# Patient Record
Sex: FEMALE | Race: WHITE | Employment: OTHER | URBAN - METROPOLITAN AREA
[De-identification: names, ages, dates, MRNs, and addresses within clinical notes are randomized per-mention and may not be internally consistent; named-entity substitution may affect disease eponyms.]

---

## 2019-10-21 ENCOUNTER — TELEPHONE (OUTPATIENT)
Dept: HEMATOLOGY | Age: 79
End: 2019-10-21

## 2019-10-21 NOTE — TELEPHONE ENCOUNTER
Spoke with patient. Patient stated her ALP was elevated in July 2019, hence the appointment with Dr. Mynor Vieira. Patient will contact office to have labs and last office note fax to our office. Has states she has not had any scans over the past year.

## 2019-10-21 NOTE — TELEPHONE ENCOUNTER
Left voice mail for patient to return call in reference to records for upcoming new patient appointment. Attempted to contact referring physician. Physician's line rang and rang. On hold with appointment line for 6 minutes.

## 2019-10-25 ENCOUNTER — OFFICE VISIT (OUTPATIENT)
Dept: HEMATOLOGY | Age: 79
End: 2019-10-25

## 2019-10-25 VITALS
BODY MASS INDEX: 25.52 KG/M2 | OXYGEN SATURATION: 97 % | TEMPERATURE: 95.2 F | WEIGHT: 153.2 LBS | HEIGHT: 65 IN | SYSTOLIC BLOOD PRESSURE: 146 MMHG | DIASTOLIC BLOOD PRESSURE: 74 MMHG | RESPIRATION RATE: 16 BRPM | HEART RATE: 63 BPM

## 2019-10-25 DIAGNOSIS — R74.8 ELEVATED LIVER ENZYMES: Primary | ICD-10-CM

## 2019-10-25 PROBLEM — K21.9 GERD (GASTROESOPHAGEAL REFLUX DISEASE): Status: ACTIVE | Noted: 2019-10-25

## 2019-10-25 RX ORDER — PRAVASTATIN SODIUM 40 MG/1
40 TABLET ORAL DAILY
Refills: 0 | COMMUNITY
Start: 2019-09-18

## 2019-10-25 RX ORDER — DILTIAZEM HYDROCHLORIDE 240 MG/1
240 CAPSULE, EXTENDED RELEASE ORAL EVERY EVENING
Refills: 0 | COMMUNITY
Start: 2019-09-04

## 2019-10-25 RX ORDER — HYDROCHLOROTHIAZIDE 25 MG/1
25 TABLET ORAL DAILY
Refills: 0 | COMMUNITY
Start: 2019-09-18

## 2019-10-25 RX ORDER — CLONIDINE HYDROCHLORIDE 0.2 MG/1
0.2 TABLET ORAL DAILY
Refills: 1 | COMMUNITY
Start: 2019-07-22

## 2019-10-25 RX ORDER — LISINOPRIL 40 MG/1
40 TABLET ORAL DAILY
Refills: 0 | COMMUNITY
Start: 2019-08-30

## 2019-10-25 RX ORDER — LEVOTHYROXINE SODIUM 88 UG/1
88 TABLET ORAL
Refills: 0 | COMMUNITY
Start: 2019-08-30

## 2019-10-25 NOTE — Clinical Note
10/25/19 Patient: Phu Crenshaw YOB: 1940 Date of Visit: 10/25/2019 Car Andre MD 
[address] [id number] VIA In Basket Dear Car Andre MD, Thank you for referring Ms. Sherwin Fothergill to [facility] for evaluation. My notes for this consultation are attached. If you have questions, please do not hesitate to call me. I look forward to following your patient along with you. Sincerely, Cesilia Romano MD

## 2019-10-25 NOTE — PROGRESS NOTES
[facility], Karl SLOAN Noel Kraft, MD Deirdre Boot, PANICOLLE Pitts, [facility]     July RIOS Kelsie, [facility]   Tarsha Soto, P-LESLIE Jorge, [facility]       [facility] 136    at [address] [place], [address] [place]    [address] [facility] 22.    [phone]    FAX: [address] [place] [address], [address]    [phone]    FAX: [phone]       Patient Care Team:  Crescencio White MD as PCP - General (Family Practice)      Problem List  Date Reviewed: 10/25/2019          Codes Class Noted    Elevated alkaline phosphatase level ICD-10-CM: R74.8  ICD-9-CM: 790.5  10/25/2019        Hypothyroidism ICD-10-CM: E03.9  ICD-9-CM: 244.9  8/13/2012        HTN (hypertension) ICD-10-CM: I10  ICD-9-CM: 401.9  2/10/2012        Hypercholesteremia ICD-10-CM: E78.00  ICD-9-CM: 272.0  2/10/2012              The clinicians listed above have asked me to see Ne Chi in consultation regarding elevated liver enzymes and its management. All medical records sent by the referring physicians were reviewed   including imaging studies     The patient is a 78 y.o.  female who was found to have elevated alkaline phosphatase in   7/2019. Serologic evaluation for markers of chronic liver disease was negative for LEONARD,     Imaging of the liver was not performed. An assessment of liver fibrosis with biopsy or elastography has not been performed. The patient had not started any new medications within 3 months preceding the elevation in liver chemistries. The patient has no symptoms which can be attributed to the liver disorder.     The patient has not experienced the following symptoms:  fatigue, pain in the right side over the liver, dry eyes, dry mouth, arthralgias, itching,     The patient completes all daily activities without any functional limitations. ASSESSMENT AND PLAN:  Elevated liver enzymes  Persistent elevation in alkaline phosphatase of unclear etiology at this time. Liver transaminases are normal.  Liver function is normal.  The platelet count is normal.      Serologic testing for causes of chronic liver disease were ordered. The most likely causes for the liver chemistry abnormalities were discussed with the patient and include   fatty liver disease, immune liver disorders,     Have performed laboratory testing to monitor liver function and degree of liver injury. This included BMP, hepatic panel, CBC with platelet count, INR. The need to perform an assessment of liver fibrosis was discussed with the patient. The Fibroscan can assess liver fibrosis and determine if a patient has advanced fibrosis or cirrhosis without the need for liver biopsy. This will be performed at the next office visit. If the Fibroscan suggests advanced fibrosis then a liver biopsy should be considered. The Fibroscan can be repeated annually or as often as clinically indicated to assess for fibrosis progression and/or regression. Screening for Hepatocellular Carcinoma  HCC screening is not necessary if the patient has no evidence of cirrhosis. Treatment of other medical problems in patients with chronic liver disease  There are no contraindications for the patient to take most medications that are necessary for treatment of other medical issues. Counseling for alcohol in patients with chronic liver disease  The patient was counseled regarding alcohol consumption and the effect of alcohol on chronic liver disease. The patient does not consume any significant amount of alcohol.     Vaccinations   The need for vaccination against viral hepatitis A and B will be assessed with serologic and instituted as appropriate. Routine vaccinations against other bacterial and viral agents can be performed as indicated. Annual flu vaccination should be administered if indicated. ALLERGIES  Allergies   Allergen Reactions    Amlodipine Swelling     Foot swelling    Erythromycin Anaphylaxis       MEDICATIONS  Current Outpatient Medications   Medication Sig    levothyroxine (SYNTHROID) 88 mcg tablet Take 88 mcg by mouth Daily (before breakfast).  lisinopril (PRINIVIL, ZESTRIL) 40 mg tablet Take 40 mg by mouth daily.  hydroCHLOROthiazide (HYDRODIURIL) 25 mg tablet Take 25 mg by mouth daily.  CARTIA  mg ER capsule Take 240 mg by mouth every evening.  pravastatin (PRAVACHOL) 40 mg tablet Take 40 mg by mouth daily.  cloNIDine HCl (CATAPRES) 0.2 mg tablet Take 0.2 mg by mouth daily.  Cholecalciferol, Vitamin D3, (VITAMIN D3) 1,000 unit Cap Take 1,000 Units by mouth daily.  omeprazole (PRILOSEC OTC) 20 mg tablet Take 20 mg by mouth daily.  levothyroxine (SYNTHROID) 75 mcg tablet Take 1 Tab by mouth Daily (before breakfast). (Patient not taking: Reported on 10/25/2019)    pravastatin (PRAVACHOL) 20 mg tablet Take 1 Tab by mouth daily. (Patient not taking: Reported on 10/25/2019)    amLODIPine (NORVASC) 5 mg tablet Take 1 Tab by mouth daily. (Patient not taking: Reported on 10/25/2019)    valACYclovir (VALTREX) 1 g tablet Take 1 Tab by mouth three (3) times daily.  triamcinolone (ARISTOCORT) 0.5 % topical cream Apply  to affected area two (2) times a day. use thin layer    losartan-hydrochlorothiazide (HYZAAR) 100-25 mg per tablet Take 1 Tab by mouth daily.  valsartan-hydrochlorothiazide (DIOVAN-HCT) 320-25 mg per tablet Take 1 Tab by mouth daily.  LEVOTHYROXINE SODIUM (LEVOTHYROXINE, BULK,) Take 75 mcg by mouth daily. No current facility-administered medications for this visit.         SYSTEM REVIEW NOT RELATED TO LIVER DISEASE OR REVIEWED ABOVE:  Constitution systems: Negative for fever, chills, weight gain, weight loss. Eyes: Negative for visual changes. ENT: Negative for sore throat, painful swallowing. Respiratory: Negative for cough, hemoptysis, SOB. Cardiology: Negative for chest pain, palpitations. GI:  Constipation  : Negative for urinary frequency, dysuria, hematuria, nocturia. Skin: Negative for rash. Hematology: Negative for easy bruising, blood clots. Musculo-skelatal: Negative for back pain, muscle pain, weakness. Neurologic: Negative for headaches, dizziness, vertigo, memory problems not related to HE. Psychology: Negative for anxiety, depression. FAMILY HISTORY:  The father  of CVA. The mother  at age 80 after a fall. There is no family history of liver disease. SOCIAL HISTORY:  The patient is . The patient has 4 children, 6 grandchildren. The patient stopped using tobacco products in . The patient consumes 1-2 alcoholic beverages per day   The patient used to work as commercial real estate. PHYSICAL EXAMINATION:  Visit Vitals  /74   Pulse 63   Temp 95.2 °F (35.1 °C) (Tympanic)   Resp 16   Ht 5' 5\" (1.651 m)   Wt 153 lb 3.2 oz (69.5 kg)   SpO2 97%   BMI 25.49 kg/m²     General: No acute distress. Eyes: Sclera anicteric. ENT: No oral lesions. Thyroid normal.  Nodes: No adenopathy. Skin: No spider angiomata. No jaundice. No palmar erythema. Respiratory: Lungs clear to auscultation. Cardiovascular: Regular heart rate. No murmurs. No JVD. Abdomen: Soft non-tender. Liver size normal to percussion/palpation. Spleen not palpable. No obvious ascites. Extremities: No edema. No muscle wasting. No gross arthritic changes. Neurologic: Alert and oriented. Cranial nerves grossly intact. No asterixis. LABORATORY STUDIES:  From 2019  AST/ALT/ALP/T Bili/ALB:  25/31/172/0.6/4.4  WBC/HB/PLT/INR:  4.2/13.4/139  BUN/CREAT:  8/0.84    SEROLOGIES:  2019.   LEONARD negative, RF negative    LIVER HISTOLOGY:  Not available or performed    ENDOSCOPIC PROCEDURES:  Not available or performed    RADIOLOGY:  Not available or performed    OTHER TESTIN2019. TSH 7, T4 Free 1.71    FOLLOW-UP:  All of the issues listed above in the Assessment and Plan were discussed with the patient. All questions were answered. The patient expressed a clear understanding of the above. [address] [place] [address] in 4 weeks for Fibroscan to review all data and determine the treatment plan.       Kamilla Lyons MD  [address], [address] [place] [address] 22.  [phone]  [address] [place] [address]

## 2019-10-25 NOTE — PROGRESS NOTES
Identified pt with two pt identifiers(name and ). Reviewed record in preparation for visit and have obtained necessary documentation. Chief Complaint   Patient presents with    New Patient     abnormal labs      Learning Assessment 10/25/2019   PRIMARY LEARNER Patient   HIGHEST LEVEL OF EDUCATION - PRIMARY LEARNER  GRADUATED HIGH SCHOOL OR GED   BARRIERS PRIMARY LEARNER NONE   CO-LEARNER CAREGIVER No   PRIMARY LANGUAGE ENGLISH    NEED No   LEARNER PREFERENCE PRIMARY OTHER (COMMENT)   LEARNING SPECIAL TOPICS no   ANSWERED BY self   RELATIONSHIP SELF   ASSESSMENT COMMENT no       3 most recent PHQ Screens 10/25/2019   Little interest or pleasure in doing things Not at all   Feeling down, depressed, irritable, or hopeless Not at all   Total Score PHQ 2 0       Abuse Screening Questionnaire 10/25/2019   Do you ever feel afraid of your partner? N   Are you in a relationship with someone who physically or mentally threatens you? N   Is it safe for you to go home? Y         Coordination of Care Questionnaire:  :   1) Have you been to an emergency room, urgent care, or hospitalized since your last visit? If yes, where when, and reason for visit? no       2. Have seen or consulted any other health care provider since your last visit? If yes, where when, and reason for visit? NO      Patient is accompanied by self I have received verbal consent from Nighat Bland to discuss any/all medical information while they are present in the room.

## 2019-10-26 LAB
ALBUMIN SERPL-MCNC: 4.2 G/DL (ref 3.5–4.8)
ALP SERPL-CCNC: 224 IU/L (ref 39–117)
ALT SERPL-CCNC: 23 IU/L (ref 0–32)
AST SERPL-CCNC: 33 IU/L (ref 0–40)
BASOPHILS # BLD AUTO: 0 X10E3/UL (ref 0–0.2)
BASOPHILS NFR BLD AUTO: 1 %
BILIRUB DIRECT SERPL-MCNC: 0.22 MG/DL (ref 0–0.4)
BILIRUB SERPL-MCNC: 0.5 MG/DL (ref 0–1.2)
BUN SERPL-MCNC: 7 MG/DL (ref 8–27)
BUN/CREAT SERPL: 9 (ref 12–28)
CALCIUM SERPL-MCNC: 9.5 MG/DL (ref 8.7–10.3)
CHLORIDE SERPL-SCNC: 92 MMOL/L (ref 96–106)
CO2 SERPL-SCNC: 25 MMOL/L (ref 20–29)
COMMENT, 144067: NORMAL
CREAT SERPL-MCNC: 0.82 MG/DL (ref 0.57–1)
EOSINOPHIL # BLD AUTO: 0.1 X10E3/UL (ref 0–0.4)
EOSINOPHIL NFR BLD AUTO: 2 %
ERYTHROCYTE [DISTWIDTH] IN BLOOD BY AUTOMATED COUNT: 12.4 % (ref 12.3–15.4)
GLUCOSE SERPL-MCNC: 97 MG/DL (ref 65–99)
HBV SURFACE AG SERPL QL IA: NEGATIVE
HCT VFR BLD AUTO: 37.8 % (ref 34–46.6)
HCV AB S/CO SERPL IA: <0.1 S/CO RATIO (ref 0–0.9)
HGB BLD-MCNC: 12.7 G/DL (ref 11.1–15.9)
IMM GRANULOCYTES # BLD AUTO: 0 X10E3/UL (ref 0–0.1)
IMM GRANULOCYTES NFR BLD AUTO: 0 %
INR PPP: 1 (ref 0.8–1.2)
LYMPHOCYTES # BLD AUTO: 1.2 X10E3/UL (ref 0.7–3.1)
LYMPHOCYTES NFR BLD AUTO: 26 %
MCH RBC QN AUTO: 29.1 PG (ref 26.6–33)
MCHC RBC AUTO-ENTMCNC: 33.6 G/DL (ref 31.5–35.7)
MCV RBC AUTO: 87 FL (ref 79–97)
MONOCYTES # BLD AUTO: 0.5 X10E3/UL (ref 0.1–0.9)
MONOCYTES NFR BLD AUTO: 11 %
NEUTROPHILS # BLD AUTO: 2.8 X10E3/UL (ref 1.4–7)
NEUTROPHILS NFR BLD AUTO: 60 %
PLATELET # BLD AUTO: 147 X10E3/UL (ref 150–450)
POTASSIUM SERPL-SCNC: 3.5 MMOL/L (ref 3.5–5.2)
PROT SERPL-MCNC: 7.1 G/DL (ref 6–8.5)
PROTHROMBIN TIME: 10.5 SEC (ref 9.1–12)
RBC # BLD AUTO: 4.37 X10E6/UL (ref 3.77–5.28)
SODIUM SERPL-SCNC: 134 MMOL/L (ref 134–144)
WBC # BLD AUTO: 4.6 X10E3/UL (ref 3.4–10.8)

## 2019-10-27 LAB
ACTIN IGG SERPL-ACNC: 129 UNITS (ref 0–19)
MITOCHONDRIA M2 IGG SER-ACNC: 134.1 UNITS (ref 0–20)

## 2019-10-28 LAB
C-ANCA TITR SER IF: NORMAL TITER
P-ANCA ATYPICAL TITR SER IF: NORMAL TITER
P-ANCA TITR SER IF: NORMAL TITER

## 2019-11-06 ENCOUNTER — HOSPITAL ENCOUNTER (OUTPATIENT)
Dept: ULTRASOUND IMAGING | Age: 79
Discharge: HOME | End: 2019-11-06
Attending: INTERNAL MEDICINE
Payer: MEDICARE

## 2019-11-06 DIAGNOSIS — R74.8 ELEVATED LIVER ENZYMES: ICD-10-CM

## 2019-11-06 PROCEDURE — 76700 US EXAM ABDOM COMPLETE: CPT

## 2019-11-26 ENCOUNTER — OFFICE VISIT (OUTPATIENT)
Dept: HEMATOLOGY | Age: 79
End: 2019-11-26

## 2019-11-26 VITALS
BODY MASS INDEX: 24.93 KG/M2 | WEIGHT: 149.6 LBS | SYSTOLIC BLOOD PRESSURE: 170 MMHG | HEIGHT: 65 IN | TEMPERATURE: 97.3 F | DIASTOLIC BLOOD PRESSURE: 96 MMHG | OXYGEN SATURATION: 99 % | HEART RATE: 57 BPM

## 2019-11-26 DIAGNOSIS — R74.8 ELEVATED ALKALINE PHOSPHATASE LEVEL: Primary | ICD-10-CM

## 2019-11-26 NOTE — PROGRESS NOTES
[facility], Julien SLOAN, [address] , YOGI Odonnell, [facility]     July Iglesias, [facility]   Rika Apodaca, FRANCIS Laguerre, [facility]       Kalia Geller Salvador De Webber 136    at [address], [address] [facility], [place]  22.    [phone]    FAX: [address] [place] [address]    at [address], [address]    [phone]    FAX: [phone]     Patient Care Team:  Qing Roldan MD as PCP - General (Family Practice)    Problem List  Date Reviewed: 10/25/2019          Codes Class Noted    Elevated alkaline phosphatase level ICD-10-CM: R74.8  ICD-9-CM: 790.5  10/25/2019        GERD (gastroesophageal reflux disease) ICD-10-CM: K21.9  ICD-9-CM: 530.81  10/25/2019        Hypothyroidism ICD-10-CM: E03.9  ICD-9-CM: 244.9  8/13/2012        HTN (hypertension) ICD-10-CM: I10  ICD-9-CM: 401.9  2/10/2012        Hypercholesteremia ICD-10-CM: E78.00  ICD-9-CM: 272.0  2/10/2012            Patrice Rosales returns to the [address] for management of elevated alkaline phosphatase. The active problem list, all pertinent past medical history, medications, liver histology, radiologic findings and laboratory findings related to the liver disorder were reviewed with the patient. The patient is a 78 y.o.  female who was found to have elevated alkaline phosphatase in 7/2019. Serologic evaluation for markers of chronic liver disease was positive for AMA and ASMA. An ultrasound suggested micro nodularity perhaps with cirrhosis. An assessment of liver fibrosis with elastography will be performed this visit. The patient has no symptoms which can be attributed to the liver disorder.     The patient has not experienced the following symptoms: fatigue, pain in the right side over the liver, dry eyes, dry mouth, arthralgias or itching. The patient completes all daily activities without any functional limitations. 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 Liver transaminases are normal. ALP is elevated. Liver function is normal. The platelet count is normal.      Serologic testing for causes of chronic liver disease was positive for AMA and ASMA. With her age and lack of fibrosis, I do not think we need to start a medication. We can monitor her for fibrosis progression but I doubt she will ever need treatment. I think the US was a bit of an over read. Screening for hepatocellular carcinoma  HCC screening is not necessary if the patient has no evidence of cirrhosis. Treatment of other medical problems in patients with chronic liver disease  There are no contraindications for the patient to take most medications necessary for treatment of other medical issues. Counseling for alcohol in patients with chronic liver disease  The patient was counseled regarding alcohol consumption and the effect of alcohol on chronic liver disease. The patient does not consume any significant amount of alcohol. Vaccinations   The need for vaccination against viral hepatitis A and B will be assessed with serologic and instituted as appropriate. Routine vaccinations against other bacterial and viral agents can be performed as indicated. Annual flu vaccination should be administered if indicated. ALLERGIES  Allergies   Allergen Reactions    Amlodipine Swelling     Foot swelling    Erythromycin Anaphylaxis     MEDICATIONS  Current Outpatient Medications   Medication Sig    levothyroxine (SYNTHROID) 88 mcg tablet Take 88 mcg by mouth Daily (before breakfast).     lisinopril (PRINIVIL, ZESTRIL) 40 mg tablet Take 40 mg by mouth daily.    hydroCHLOROthiazide (HYDRODIURIL) 25 mg tablet Take 25 mg by mouth daily.  CARTIA  mg ER capsule Take 240 mg by mouth every evening.  pravastatin (PRAVACHOL) 40 mg tablet Take 40 mg by mouth daily.  cloNIDine HCl (CATAPRES) 0.2 mg tablet Take 0.2 mg by mouth daily.  levothyroxine (SYNTHROID) 75 mcg tablet Take 1 Tab by mouth Daily (before breakfast). (Patient not taking: Reported on 10/25/2019)    pravastatin (PRAVACHOL) 20 mg tablet Take 1 Tab by mouth daily. (Patient not taking: Reported on 10/25/2019)    amLODIPine (NORVASC) 5 mg tablet Take 1 Tab by mouth daily. (Patient not taking: Reported on 10/25/2019)    valACYclovir (VALTREX) 1 g tablet Take 1 Tab by mouth three (3) times daily.  Cholecalciferol, Vitamin D3, (VITAMIN D3) 1,000 unit Cap Take 1,000 Units by mouth daily.  omeprazole (PRILOSEC OTC) 20 mg tablet Take 20 mg by mouth daily.  triamcinolone (ARISTOCORT) 0.5 % topical cream Apply  to affected area two (2) times a day. use thin layer    losartan-hydrochlorothiazide (HYZAAR) 100-25 mg per tablet Take 1 Tab by mouth daily.  valsartan-hydrochlorothiazide (DIOVAN-HCT) 320-25 mg per tablet Take 1 Tab by mouth daily.  LEVOTHYROXINE SODIUM (LEVOTHYROXINE, BULK,) Take 75 mcg by mouth daily. No current facility-administered medications for this visit. SYSTEM REVIEW NOT RELATED TO LIVER DISEASE OR REVIEWED ABOVE:  Constitution systems: Negative for fever, chills, weight gain, weight loss. Eyes: Negative for visual changes. ENT: Negative for sore throat, painful swallowing. Respiratory: Negative for cough, hemoptysis, SOB. Cardiology: Negative for chest pain, palpitations. GI:  Constipation  : Negative for urinary frequency, dysuria, hematuria, nocturia. Skin: Negative for rash. Hematology: Negative for easy bruising, blood clots. Musculo-skeletal: Negative for back pain, muscle pain, weakness.   Neurologic: Negative for headaches, dizziness, vertigo, memory problems not related to HE. Psychology: Negative for anxiety, depression. FAMILY HISTORY:  The father  of CVA. The mother  at age 80 after a fall. There is no family history of liver disease. SOCIAL HISTORY:  The patient is . The patient has 4 children and 6 grandchildren. The patient stopped using tobacco products in . The patient consumes 1-2 alcoholic beverages per day   The patient used to work as commercial real estate. PHYSICAL EXAMINATION:  Visit Vitals  BP (!) 170/96 (BP 1 Location: Left arm, BP Patient Position: Sitting)   Pulse (!) 57   Temp 97.3 °F (36.3 °C) (Tympanic)   Ht 5' 5\" (1.651 m)   Wt 149 lb 9.6 oz (67.9 kg)   SpO2 99%   BMI 24.89 kg/m²     General: No acute distress. Eyes: Sclera anicteric. ENT: No oral lesions. Nodes: No adenopathy. Skin: No spider angiomata. No jaundice. No palmar erythema. Respiratory: Lungs clear to auscultation. Cardiovascular: Regular heart rate. No murmurs. No JVD. Abdomen: Soft non-tender. Liver size normal to percussion/palpation. Spleen not palpable. No obvious ascites. Extremities: No edema. No muscle wasting. No gross arthritic changes. Neurologic: Alert and oriented. Cranial nerves grossly intact. No asterixis.     LABORATORY STUDIES:  [place] [id number] Units 10/25/2019   WBC 3.4 - 10.8 x10E3/uL 4.6   ANC 1.4 - 7.0 x10E3/uL 2.8   HGB 11.1 - 15.9 g/dL 12.7    - 450 x10E3/uL 147 (L)   INR 0.8 - 1.2 1.0   AST 0 - 40 IU/L 33   ALT 0 - 32 IU/L 23   Alk Phos 39 - 117 IU/L 224 (H)   Bili, Total 0.0 - 1.2 mg/dL 0.5   Bili, Direct 0.00 - 0.40 mg/dL 0.22   Albumin 3.5 - 4.8 g/dL 4.2   BUN 8 - 27 mg/dL 7 (L)   Creat 0.57 - 1.00 mg/dL 0.82   Na 134 - 144 mmol/L 134   K 3.5 - 5.2 mmol/L 3.5   Cl 96 - 106 mmol/L 92 (L)   CO2 20 - 29 mmol/L 25   Glucose 65 - 99 mg/dL 97     From 2019  AST/ALT/ALP/T Bili/ALB: //172/0.6/4.4  WBC/HB/PLT/INR: 4. 2/13.4/139  BUN/CREAT: 8/0.84    SEROLOGIES:  Serologies Latest Ref Rng & Units 10/25/2019   Hep B Surface Ag Negative Negative   Hep C Ab 0.0 - 0.9 s/co ratio <0.1   C-ANCA Neg:<1:20 titer <1:20   P-ANCA Neg:<1:20 titer <1:20   ANCA Neg:<1:20 titer <1:20   ASMCA 0 - 19 Units 129 (H)   M2 Ab 0.0 - 20.0 Units 134.1 (H)     2019. LEONARD negative, RF negative    LIVER HISTOLOGY:  2019. [facility]. EkPa was 4.5. IQR/med 9%. . The results suggested a fibrosis level of F0. The CAP score suggests no evidence of fatty liver. ENDOSCOPIC PROCEDURES:  Not available or performed    RADIOLOGY:  2019. Abdominal ultrasound. Micro nodularity of the liver suggesting cirrhotic changes. No biliary dilation. Gallbladder containing stones but without wall thickening. Splenomegaly    OTHER TESTIN2019. TSH 7, T4 Free 1.71    FOLLOW-UP:  All of the issues listed above in the assessment and plan were discussed with the patient. All questions were answered. The patient expressed a clear understanding of the above. [facility] in 6 months. We can recheck enzymes at that time. Repeat FibroScan every 1-2 years.      JULIA Angel-BC  Liver [facility] [address] [facility], [address] [address]  22.  [phone]

## 2019-11-26 NOTE — PROGRESS NOTES
Chief Complaint   Patient presents with    Follow-up     fibro scan     Visit Vitals  BP (!) 170/96 (BP 1 Location: Left arm, BP Patient Position: Sitting)   Pulse (!) 57   Temp 97.3 °F (36.3 °C) (Tympanic)   Ht 5' 5\" (1.651 m)   Wt 149 lb 9.6 oz (67.9 kg)   SpO2 99%   BMI 24.89 kg/m²     Patient states bp is managed by pcp - is on 4 bp meds has taken them today ! Next follow up with PCP is either Dec or Jan. Patient states PCP is aware of elevated pressures. Will inform provider. Abuse Screening Questionnaire 10/25/2019   Do you ever feel afraid of your partner? N   Are you in a relationship with someone who physically or mentally threatens you? N   Is it safe for you to go home? Y     3 most recent PHQ Screens 10/25/2019   Little interest or pleasure in doing things Not at all   Feeling down, depressed, irritable, or hopeless Not at all   Total Score PHQ 2 0     Learning Assessment 10/25/2019   PRIMARY LEARNER Patient   HIGHEST LEVEL OF EDUCATION - PRIMARY LEARNER  GRADUATED HIGH SCHOOL OR GED   BARRIERS PRIMARY LEARNER NONE   CO-LEARNER CAREGIVER No   PRIMARY LANGUAGE ENGLISH    NEED No   LEARNER PREFERENCE PRIMARY OTHER (COMMENT)   LEARNING SPECIAL TOPICS no   ANSWERED BY self   RELATIONSHIP SELF   ASSESSMENT COMMENT no     1. Have you been to the ER, urgent care clinic since your last visit? Hospitalized since your last visit? No    2. Have you seen or consulted any other health care providers outside of the [address] [place] [address] since your last visit? Include any pap smears or colon screening.  No

## 2020-05-26 ENCOUNTER — VIRTUAL VISIT (OUTPATIENT)
Dept: HEMATOLOGY | Age: 80
End: 2020-05-26

## 2020-05-26 DIAGNOSIS — R74.8 ELEVATED ALKALINE PHOSPHATASE LEVEL: Primary | ICD-10-CM

## 2020-05-26 NOTE — PROGRESS NOTES
[facility], MD, Angel Carrington MD Ellin Copas, PANICOLLE Concepcion, [facility]     July Iglesias, [facility]   KIN Allen-LESLIE Chacko, [facility]       Kalia Geller [address]    at [address], [facility] Daniel Espinoza  22.    [phone]    FAX: [address] [place] [address], [address]    [phone]    FAX: [phone]     Patient Care Team:  Traci Mccrary MD as PCP - General (Family Practice)  Traci Mccrary MD as PCP - [facility] Provider    Problem List  Date Reviewed: 11/26/2019          Codes Class Noted    Elevated alkaline phosphatase level ICD-10-CM: R74.8  ICD-9-CM: 790.5  10/25/2019        GERD (gastroesophageal reflux disease) ICD-10-CM: K21.9  ICD-9-CM: 530.81  10/25/2019        Hypothyroidism ICD-10-CM: E03.9  ICD-9-CM: 244.9  8/13/2012        HTN (hypertension) ICD-10-CM: I10  ICD-9-CM: 401.9  2/10/2012        Hypercholesteremia ICD-10-CM: E78.00  ICD-9-CM: 272.0  2/10/2012            Addendum: labs received from PCP. From 6/12/2020  AST/ALT/ALP/T Bili/ALB: 31/25/168/0.6/4.3  WBC/HB/PLT/INR: 4.6/13.0/157  NA/BUN/CREAT: 135/9/0.77    End of addendum. THIS WAS TO BE A VIRTUAL VISIT BUT WAS UNABLE TO BE PERFORMED ON THE PATIENT'S NEW COMPUTER. She has only had issues with telehealth visits, no other video based platforms. Julius Pedro is a [de-identified] y.o. female evaluated via audio only technology on 5/26/2020. Consent: She and/or her health care decision maker is aware that she may receive a bill for this audio only encounter, depending on her insurance coverage, and has provided verbal consent to proceed: yes.     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 Liver transaminases are normal. ALP is elevated. Liver function is normal. The platelet count is normal.      Serologic testing for causes of chronic liver disease was positive for AMA and ASMA. With her age and lack of fibrosis, I do not think we need to start a medication. We can monitor her for fibrosis progression but I doubt she will ever need treatment. I think the US was a bit of an over read. We can repeat this in 6 months. She states the physician (assuming Arnaldo Rodriges) had also told her unlikely to need medication. We discussed medication and she is very hesitant to start any new medication. I am okay with this as long as the FibroScan continues to show low fibrosis. I do not think we need a biopsy as the benefit or change in plans is minimal compared to the risk. We need new labs. She is scheduled to see her PCP next week. He will order blood work. I have asked she ask him to send results to me. Thrombocytopenia  We need to continue to watch this closely. If she does not have cirrhosis, why are the platelets low? Maybe heme/onc consult if FibroScan is consistent? 12  Subjective:   Angelica Sheth is a [de-identified] y.o. female who was seen for PBC. Prior to Admission medications    Medication Sig Start Date End Date Taking? Authorizing Provider   levothyroxine (SYNTHROID) 88 mcg tablet Take 88 mcg by mouth Daily (before breakfast). 8/30/19   Provider, Historical   lisinopril (PRINIVIL, ZESTRIL) 40 mg tablet Take 40 mg by mouth daily. 8/30/19   Provider, Historical   hydroCHLOROthiazide (HYDRODIURIL) 25 mg tablet Take 25 mg by mouth daily. 9/18/19   Provider, Historical   CARTIA  mg ER capsule Take 240 mg by mouth every evening.  9/4/19   Provider, Historical   pravastatin (PRAVACHOL) 40 mg tablet Take 40 mg by mouth daily. 9/18/19   Provider, Historical   cloNIDine HCl (CATAPRES) 0.2 mg tablet Take 0.2 mg by mouth daily. 7/22/19   Provider, Historical   levothyroxine (SYNTHROID) 75 mcg tablet Take 1 Tab by mouth Daily (before breakfast). 11/9/12   Diamond Kelly NP   amLODIPine (NORVASC) 5 mg tablet Take 1 Tab by mouth daily. 10/10/12   Diamond Kelly NP   valACYclovir (VALTREX) 1 g tablet Take 1 Tab by mouth three (3) times daily. 7/25/12   Diamond Kelly NP   Cholecalciferol, Vitamin D3, (VITAMIN D3) 1,000 unit Cap Take 1,000 Units by mouth daily. Provider, Historical   omeprazole (PRILOSEC OTC) 20 mg tablet Take 20 mg by mouth daily. Provider, Historical   triamcinolone (ARISTOCORT) 0.5 % topical cream Apply  to affected area two (2) times a day. use thin layer 6/8/12   Alis Guardado MD     Allergies   Allergen Reactions    Amlodipine Swelling     Foot swelling    Erythromycin Anaphylaxis       ROS    I affirm this is a Patient-Initiated Episode with a Patient who has not had a related appointment within my department in the past 7 days or scheduled within the next 24 hours. Total Time: 11-20 minutes. Note: not billable if this call serves to triage the patient into an appointment for the relevant concern      TOÑO Grace returns to the [address] for management of elevated alkaline phosphatase. The active problem list, all pertinent past medical history, medications, liver histology, radiologic findings and laboratory findings related to the liver disorder were reviewed with the patient. The patient is a [de-identified] y.o.  female who was found to have elevated alkaline phosphatase in 7/2019. Serologic evaluation for markers of chronic liver disease was positive for AMA and ASMA. An ultrasound suggested micro nodularity perhaps with cirrhosis. An assessment of liver fibrosis with elastography demonstrated no fibrosis. The patient has no symptoms which can be attributed to the liver disorder. She feels well. The patient has not experienced the following symptoms: fatigue, pain in the right side over the liver, dry eyes, dry mouth, arthralgias or itching. The patient completes all daily activities without any functional limitations. 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 Liver transaminases are normal. ALP is elevated. Liver function is normal. The platelet count is normal.      Serologic testing for causes of chronic liver disease was positive for AMA and ASMA. With her age and lack of fibrosis, I do not think we need to start a medication. We can monitor her for fibrosis progression but I doubt she will ever need treatment. I think the US was a bit of an over read. We can repeat this in 6 months. She states the physician (assuming Neil Bee) had also told her unlikely to need medication. We discussed medication and she is very hesitant to start any new medication. I am okay with this as long as the FibroScan continues to show low fibrosis. I do not think we need a biopsy as the benefit or change in plans is minimal compared to the risk. We need new labs. She is scheduled to see her PCP next week. He will order blood work. I have asked she ask him to send results to me. Thrombocytopenia  We need to continue to watch this closely. If she does not have cirrhosis, why are the platelets low? Maybe heme/onc consult if FibroScan is consistent? Screening for hepatocellular carcinoma  HCC screening is not necessary if the patient has no evidence of cirrhosis. Treatment of other medical problems in patients with chronic liver disease  There are no contraindications for the patient to take most medications necessary for treatment of other medical issues.     Counseling for alcohol in patients with chronic liver disease  The patient was counseled regarding alcohol consumption and the effect of alcohol on chronic liver disease. The patient does not consume any significant amount of alcohol. Vaccinations   The need for vaccination against viral hepatitis A and B will be assessed with serologic and instituted as appropriate. Routine vaccinations against other bacterial and viral agents can be performed as indicated. Annual flu vaccination should be administered if indicated. ALLERGIES  Allergies   Allergen Reactions    Amlodipine Swelling     Foot swelling    Erythromycin Anaphylaxis     MEDICATIONS  Current Outpatient Medications   Medication Sig    levothyroxine (SYNTHROID) 88 mcg tablet Take 88 mcg by mouth Daily (before breakfast).  lisinopril (PRINIVIL, ZESTRIL) 40 mg tablet Take 40 mg by mouth daily.  hydroCHLOROthiazide (HYDRODIURIL) 25 mg tablet Take 25 mg by mouth daily.  CARTIA  mg ER capsule Take 240 mg by mouth every evening.  pravastatin (PRAVACHOL) 40 mg tablet Take 40 mg by mouth daily.  cloNIDine HCl (CATAPRES) 0.2 mg tablet Take 0.2 mg by mouth daily.  levothyroxine (SYNTHROID) 75 mcg tablet Take 1 Tab by mouth Daily (before breakfast).  amLODIPine (NORVASC) 5 mg tablet Take 1 Tab by mouth daily.  valACYclovir (VALTREX) 1 g tablet Take 1 Tab by mouth three (3) times daily.  Cholecalciferol, Vitamin D3, (VITAMIN D3) 1,000 unit Cap Take 1,000 Units by mouth daily.  omeprazole (PRILOSEC OTC) 20 mg tablet Take 20 mg by mouth daily.  triamcinolone (ARISTOCORT) 0.5 % topical cream Apply  to affected area two (2) times a day. use thin layer     No current facility-administered medications for this visit. SYSTEM REVIEW NOT RELATED TO LIVER DISEASE OR REVIEWED ABOVE:  Constitution systems: Negative for fever, chills, weight gain, weight loss. Eyes: Negative for visual changes. ENT: Negative for sore throat, painful swallowing. Respiratory: Negative for cough, hemoptysis, SOB. Cardiology: Negative for chest pain, palpitations. GI:  Constipation  : Negative for urinary frequency, dysuria, hematuria, nocturia. Skin: Negative for rash. Hematology: Negative for easy bruising, blood clots. Musculo-skeletal: Negative for back pain, muscle pain, weakness. Neurologic: Negative for headaches, dizziness, vertigo, memory problems not related to HE. Psychology: Negative for anxiety, depression. FAMILY HISTORY:  The father  of CVA. The mother  at age 80 after a fall. There is no family history of liver disease. SOCIAL HISTORY:  The patient is . The patient has 4 children and 6 grandchildren. The patient stopped using tobacco products in . The patient consumes 1-2 alcoholic beverages per day   The patient used to work as commercial real estate. PHYSICAL EXAMINATION:  No physical exam was performed. LABORATORY STUDIES:  Liver [place] of [id number] Units 10/25/2019   WBC 3.4 - 10.8 x10E3/uL 4.6   ANC 1.4 - 7.0 x10E3/uL 2.8   HGB 11.1 - 15.9 g/dL 12.7    - 450 x10E3/uL 147 (L)   INR 0.8 - 1.2 1.0   AST 0 - 40 IU/L 33   ALT 0 - 32 IU/L 23   Alk Phos 39 - 117 IU/L 224 (H)   Bili, Total 0.0 - 1.2 mg/dL 0.5   Bili, Direct 0.00 - 0.40 mg/dL 0.22   Albumin 3.5 - 4.8 g/dL 4.2   BUN 8 - 27 mg/dL 7 (L)   Creat 0.57 - 1.00 mg/dL 0.82   Na 134 - 144 mmol/L 134   K 3.5 - 5.2 mmol/L 3.5   Cl 96 - 106 mmol/L 92 (L)   CO2 20 - 29 mmol/L 25   Glucose 65 - 99 mg/dL 97     From 2019  AST/ALT/ALP/T Bili/ALB: /172/0.6/4.4  WBC/HB/PLT/INR: 4.2/13.4/139  BUN/CREAT: 8/0.84    SEROLOGIES:  Serologies Latest Ref Rng & Units 10/25/2019   Hep B Surface Ag Negative Negative   Hep C Ab 0.0 - 0.9 s/co ratio <0.1   C-ANCA Neg:<1:20 titer <1:20   P-ANCA Neg:<1:20 titer <1:20   ANCA Neg:<1:20 titer <1:20   ASMCA 0 - 19 Units 129 (H)   M2 Ab 0.0 - 20.0 Units 134.1 (H)     2019. LEONARD negative, RF negative    LIVER HISTOLOGY:  2019. FibroScan performed at [address]. EkPa was 4.5. IQR/med 9%. . The results suggested a fibrosis level of F0. The CAP score suggests no evidence of fatty liver. ENDOSCOPIC PROCEDURES:  Not available or performed    RADIOLOGY:  2019. Abdominal ultrasound. Micro nodularity of the liver suggesting cirrhotic changes. No biliary dilation. Gallbladder containing stones but without wall thickening. Splenomegaly    OTHER TESTIN2019. TSH 7, T4 Free 1.71    FOLLOW-UP:  All of the issues listed above in the assessment and plan were discussed with the patient. All questions were answered. The patient expressed a clear understanding of the above. Follow up in the [place] [address] in 6 months with FibroScan. Will follow up on PCP labs when I receive them.      JULIA Bella-BC  Liver [place] [address], [address] Daniel Espinoza  22.  [phone]

## 2021-11-24 ENCOUNTER — OFFICE VISIT (OUTPATIENT)
Dept: HEMATOLOGY | Age: 81
End: 2021-11-24
Payer: MEDICARE

## 2021-11-24 VITALS
RESPIRATION RATE: 18 BRPM | WEIGHT: 149 LBS | HEIGHT: 65 IN | TEMPERATURE: 96.6 F | BODY MASS INDEX: 24.83 KG/M2 | OXYGEN SATURATION: 97 % | HEART RATE: 78 BPM

## 2021-11-24 DIAGNOSIS — R74.8 ELEVATED ALKALINE PHOSPHATASE LEVEL: Primary | ICD-10-CM

## 2021-11-24 PROCEDURE — 1090F PRES/ABSN URINE INCON ASSESS: CPT | Performed by: NURSE PRACTITIONER

## 2021-11-24 PROCEDURE — G8400 PT W/DXA NO RESULTS DOC: HCPCS | Performed by: NURSE PRACTITIONER

## 2021-11-24 PROCEDURE — G8420 CALC BMI NORM PARAMETERS: HCPCS | Performed by: NURSE PRACTITIONER

## 2021-11-24 PROCEDURE — G8536 NO DOC ELDER MAL SCRN: HCPCS | Performed by: NURSE PRACTITIONER

## 2021-11-24 PROCEDURE — G8756 NO BP MEASURE DOC: HCPCS | Performed by: NURSE PRACTITIONER

## 2021-11-24 PROCEDURE — G8427 DOCREV CUR MEDS BY ELIG CLIN: HCPCS | Performed by: NURSE PRACTITIONER

## 2021-11-24 PROCEDURE — 99214 OFFICE O/P EST MOD 30 MIN: CPT | Performed by: NURSE PRACTITIONER

## 2021-11-24 PROCEDURE — G8432 DEP SCR NOT DOC, RNG: HCPCS | Performed by: NURSE PRACTITIONER

## 2021-11-24 PROCEDURE — 91200 LIVER ELASTOGRAPHY: CPT | Performed by: NURSE PRACTITIONER

## 2021-11-24 PROCEDURE — 1101F PT FALLS ASSESS-DOCD LE1/YR: CPT | Performed by: NURSE PRACTITIONER

## 2021-11-24 NOTE — PROGRESS NOTES
Identified pt with two pt identifiers(name and ). Reviewed record in preparation for visit and have obtained necessary documentation. All patient medications has been reviewed. No chief complaint on file. 3 most recent PHQ Screens 2021   Little interest or pleasure in doing things Not at all   Feeling down, depressed, irritable, or hopeless Not at all   Total Score PHQ 2 0     Abuse Screening Questionnaire 10/25/2019   Do you ever feel afraid of your partner? N   Are you in a relationship with someone who physically or mentally threatens you? N   Is it safe for you to go home? Y       Health Maintenance Due   Topic    COVID-19 Vaccine (1)    Shingrix Vaccine Age 49> (1 of 2)    Bone Densitometry (Dexa) Screening     Pneumococcal 65+ years (1 of 1 - PPSV23)    Medicare Yearly Exam     Lipid Screen     Flu Vaccine (1)     Health Maintenance Review: Patient reminded of \"due or due soon\" health maintenance. I have asked the patient to contact his/her primary care provider (PCP) for follow-up on his/her health maintenance. There were no vitals filed for this visit. Wt Readings from Last 3 Encounters:   19 149 lb 9.6 oz (67.9 kg)   10/25/19 153 lb 3.2 oz (69.5 kg)   12 159 lb 6.4 oz (72.3 kg)     Temp Readings from Last 3 Encounters:   19 97.3 °F (36.3 °C) (Tympanic)   10/25/19 95.2 °F (35.1 °C) (Tympanic)   12 95.7 °F (35.4 °C) (Oral)     BP Readings from Last 3 Encounters:   19 (!) 170/96   10/25/19 146/74   12 136/77     Pulse Readings from Last 3 Encounters:   19 (!) 57   10/25/19 63   12 62       Coordination of Care Questionnaire:   1) Have you been to an emergency room, urgent care, or hospitalized since your last visit? No    2.  Have seen or consulted any other health care provider since your last visit?  no

## 2021-11-24 NOTE — PROGRESS NOTES
Angel Luz MD, Cleopatra Silverio MD Dorice Saxon, YOGI Vargas, ACNP-BC     July Iglesias, AGNP-BC   Suzy Daly FNP-C    Lloyd Hugo, [facility]       Kalia Geller [address]    at [place] [address], [address] Daniel Espinoza  22.    [phone]    FAX: [address] [place] [address], [address]    [phone]    FAX: [phone]     Patient Care Team:  Kai Dexter MD as PCP - General (Family Medicine)  Kai Dexter MD as PCP - [facility]    Problem List  Date Reviewed: 11/26/2019          Codes Class Noted    Elevated alkaline phosphatase level ICD-10-CM: R74.8  ICD-9-CM: 790.5  10/25/2019        GERD (gastroesophageal reflux disease) ICD-10-CM: K21.9  ICD-9-CM: 530.81  10/25/2019        Hypothyroidism ICD-10-CM: E03.9  ICD-9-CM: 244.9  8/13/2012        HTN (hypertension) ICD-10-CM: I10  ICD-9-CM: 401.9  2/10/2012        Hypercholesteremia ICD-10-CM: E78.00  ICD-9-CM: 272.0  2/10/2012            Chichi Peterson returns to the [address] for management of elevated alkaline phosphatase. The active problem list, all pertinent past medical history, medications, liver histology, radiologic findings and laboratory findings related to the liver disorder were reviewed with the patient. The patient is a 80 y.o.  female who was found to have elevated alkaline phosphatase in 7/2019. An assessment of liver fibrosis with elastography demonstrated no fibrosis. This procedure will be repeated today. The patient has no symptoms which can be attributed to the liver disorder. She feels well.      The patient has not experienced the following symptoms: fatigue, pain in the right side over the liver, dry eyes, dry mouth, arthralgias or itching. The patient completes all daily activities without any functional limitations. 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 Liver transaminases are normal. ALP is elevated. Liver function is normal. The platelet count is normal.      Serologic testing for causes of chronic liver disease was positive for AMA and ASMA. With her age and lack of fibrosis, I do not think we need to start a medication. We can monitor her for fibrosis progression but I doubt she will ever need treatment. I am okay with not starting any medication as long as the FibroScan continues to show low fibrosis. I do not think we need a biopsy as the benefit or change in plans is minimal compared to the risk. Thrombocytopenia  We need to continue to watch this closely. If she does not have cirrhosis, why are the platelets low? Maybe heme/onc consult if FibroScan is consistent? She is not interested in a heme/onc consult at this time. Screening for hepatocellular carcinoma  HCC screening is not necessary if the patient has no evidence of cirrhosis. Treatment of other medical problems in patients with chronic liver disease  There are no contraindications for the patient to take most medications necessary for treatment of other medical issues. Counseling for alcohol in patients with chronic liver disease  The patient was counseled regarding alcohol consumption and the effect of alcohol on chronic liver disease. The patient does not consume any significant amount of alcohol. Vaccinations   The need for vaccination against viral hepatitis A and B will be assessed with serologic and instituted as appropriate. Routine vaccinations against other bacterial and viral agents can be performed as indicated.  Annual flu vaccination should be administered if indicated. ALLERGIES  Allergies   Allergen Reactions    Amlodipine Swelling     Foot swelling    Erythromycin Anaphylaxis     MEDICATIONS  Current Outpatient Medications   Medication Sig    levothyroxine (SYNTHROID) 88 mcg tablet Take 88 mcg by mouth Daily (before breakfast).  lisinopril (PRINIVIL, ZESTRIL) 40 mg tablet Take 40 mg by mouth daily.  hydroCHLOROthiazide (HYDRODIURIL) 25 mg tablet Take 25 mg by mouth daily.  CARTIA  mg ER capsule Take 240 mg by mouth every evening.  pravastatin (PRAVACHOL) 40 mg tablet Take 40 mg by mouth daily.  cloNIDine HCl (CATAPRES) 0.2 mg tablet Take 0.2 mg by mouth daily.  levothyroxine (SYNTHROID) 75 mcg tablet Take 1 Tab by mouth Daily (before breakfast).  amLODIPine (NORVASC) 5 mg tablet Take 1 Tab by mouth daily.  valACYclovir (VALTREX) 1 g tablet Take 1 Tab by mouth three (3) times daily.  Cholecalciferol, Vitamin D3, (VITAMIN D3) 1,000 unit Cap Take 1,000 Units by mouth daily.  omeprazole (PRILOSEC OTC) 20 mg tablet Take 20 mg by mouth daily.  triamcinolone (ARISTOCORT) 0.5 % topical cream Apply  to affected area two (2) times a day. use thin layer     No current facility-administered medications for this visit. FAMILY HISTORY:  The father  of CVA. The mother  at age 80 after a fall. There is no family history of liver disease. SOCIAL HISTORY:  The patient is . The patient has 4 children and 6 grandchildren. The patient stopped using tobacco products in . The patient consumes 1-2 alcoholic beverages per day   The patient used to work as commercial real estate. PHYSICAL EXAMINATION:  Visit Vitals  Pulse 78   Temp (!) 96.6 °F (35.9 °C) (Temporal)   Resp 18   Ht 5' 5\" (1.651 m)   Wt 149 lb (67.6 kg)   SpO2 97%   BMI 24.79 kg/m²     General: No acute distress. Eyes: Sclera anicteric. ENT: No oral lesions. Nodes: No adenopathy. Skin: No spider angiomata. No jaundice.  No palmar erythema. Respiratory: Lungs clear to auscultation. Cardiovascular: Regular heart rate. No murmurs. No JVD. Abdomen: Soft non-tender, liver size normal to percussion/palpation. Spleen not palpable. No obvious ascites. Extremities: No edema. No muscle wasting. No gross arthritic changes. Neurologic: Alert and oriented. Cranial nerves grossly intact. No asterixis. LABORATORY STUDIES:  From 6/12/2020  AST/ALT/ALP/T Bili/ALB: 31/25/168/0.6/4.3  WBC/HB/PLT/INR: 4.6/13.0/157  NA/BUN/CREAT: 135/9/0.77    Liver [place] of [address] Units 10/25/2019   WBC 3.4 - 10.8 x10E3/uL 4.6   ANC 1.4 - 7.0 x10E3/uL 2.8   HGB 11.1 - 15.9 g/dL 12.7    - 450 x10E3/uL 147 (L)   INR 0.8 - 1.2 1.0   AST 0 - 40 IU/L 33   ALT 0 - 32 IU/L 23   Alk Phos 39 - 117 IU/L 224 (H)   Bili, Total 0.0 - 1.2 mg/dL 0.5   Bili, Direct 0.00 - 0.40 mg/dL 0.22   Albumin 3.5 - 4.8 g/dL 4.2   BUN 8 - 27 mg/dL 7 (L)   Creat 0.57 - 1.00 mg/dL 0.82   Na 134 - 144 mmol/L 134   K 3.5 - 5.2 mmol/L 3.5   Cl 96 - 106 mmol/L 92 (L)   CO2 20 - 29 mmol/L 25   Glucose 65 - 99 mg/dL 97     From 6/2019  AST/ALT/ALP/T Bili/ALB: 25/31/172/0.6/4.4  WBC/HB/PLT/INR: 4.2/13.4/139  BUN/CREAT: 8/0.84    SEROLOGIES:  Serologies Latest Ref Rng & Units 10/25/2019   Hep B Surface Ag Negative Negative   Hep C Ab 0.0 - 0.9 s/co ratio <0.1   C-ANCA Neg:<1:20 titer <1:20   P-ANCA Neg:<1:20 titer <1:20   ANCA Neg:<1:20 titer <1:20   ASMCA 0 - 19 Units 129 (H)   M2 Ab 0.0 - 20.0 Units 134.1 (H)     6/2019. LEONARD negative, RF negative    LIVER HISTOLOGY:  11/2021. FibroScan performed at [address]. EkPa was 4.9. IQR/med 18%. . The results suggested a fibrosis level of F0. The CAP score suggests no fatty liver but upper limit of no evidence of fatty liver. 11/2019. FibroScan performed at [address]. EkPa was 4.5. IQR/med 9%. . The results suggested a fibrosis level of F0.  The CAP score suggests no evidence of fatty liver. ENDOSCOPIC PROCEDURES:  Not available or performed    RADIOLOGY:  2019. Abdominal ultrasound. Micro nodularity of the liver suggesting cirrhotic changes. No biliary dilation. Gallbladder containing stones but without wall thickening. Splenomegaly    OTHER TESTIN2019. TSH 7, T4 Free 1.71    FOLLOW-UP:  All of the issues listed above in the assessment and plan were discussed with the patient. All questions were answered. The patient expressed a clear understanding of the above. Follow up in the [facility] in 6 months. Will recheck labs at that time.     Bhumi Hernandez, [facility] Hospital[facility]  Liver [place] [facility] [address] [facility], [address] [place]ino[place]  [address].  [phone]